# Patient Record
Sex: MALE | Race: WHITE | NOT HISPANIC OR LATINO | Employment: FULL TIME | URBAN - METROPOLITAN AREA
[De-identification: names, ages, dates, MRNs, and addresses within clinical notes are randomized per-mention and may not be internally consistent; named-entity substitution may affect disease eponyms.]

---

## 2018-06-14 ENCOUNTER — AMBULATORY - HEALTHEAST (OUTPATIENT)
Dept: NURSING | Facility: CLINIC | Age: 28
End: 2018-06-14

## 2018-06-14 ENCOUNTER — AMBULATORY - HEALTHEAST (OUTPATIENT)
Dept: FAMILY MEDICINE | Facility: CLINIC | Age: 28
End: 2018-06-14

## 2020-11-10 ENCOUNTER — AMBULATORY - HEALTHEAST (OUTPATIENT)
Dept: NURSING | Facility: CLINIC | Age: 30
End: 2020-11-10

## 2020-11-10 DIAGNOSIS — Z23 NEED FOR IMMUNIZATION AGAINST INFLUENZA: ICD-10-CM

## 2021-06-27 ENCOUNTER — HEALTH MAINTENANCE LETTER (OUTPATIENT)
Age: 31
End: 2021-06-27

## 2021-10-09 ENCOUNTER — TELEPHONE (OUTPATIENT)
Dept: EMERGENCY MEDICINE | Facility: HOSPITAL | Age: 31
End: 2021-10-09

## 2021-10-09 ENCOUNTER — APPOINTMENT (OUTPATIENT)
Dept: CT IMAGING | Facility: HOSPITAL | Age: 31
End: 2021-10-09
Attending: STUDENT IN AN ORGANIZED HEALTH CARE EDUCATION/TRAINING PROGRAM
Payer: COMMERCIAL

## 2021-10-09 ENCOUNTER — HOSPITAL ENCOUNTER (EMERGENCY)
Facility: HOSPITAL | Age: 31
Discharge: HOME | End: 2021-10-09
Attending: STUDENT IN AN ORGANIZED HEALTH CARE EDUCATION/TRAINING PROGRAM | Admitting: STUDENT IN AN ORGANIZED HEALTH CARE EDUCATION/TRAINING PROGRAM
Payer: COMMERCIAL

## 2021-10-09 VITALS
WEIGHT: 160 LBS | OXYGEN SATURATION: 98 % | HEART RATE: 112 BPM | SYSTOLIC BLOOD PRESSURE: 140 MMHG | DIASTOLIC BLOOD PRESSURE: 72 MMHG | TEMPERATURE: 98 F | RESPIRATION RATE: 16 BRPM

## 2021-10-09 DIAGNOSIS — R51.9 NONINTRACTABLE HEADACHE, UNSPECIFIED CHRONICITY PATTERN, UNSPECIFIED HEADACHE TYPE: ICD-10-CM

## 2021-10-09 PROBLEM — L30.9 ECZEMA, UNSPECIFIED TYPE: Status: ACTIVE | Noted: 2018-01-31

## 2021-10-09 LAB — SARS-COV-2 RNA RESP QL NAA+PROBE: POSITIVE

## 2021-10-09 PROCEDURE — C9803 HOPD COVID-19 SPEC COLLECT: HCPCS

## 2021-10-09 PROCEDURE — 99284 EMERGENCY DEPT VISIT MOD MDM: CPT | Mod: 25

## 2021-10-09 PROCEDURE — 87635 SARS-COV-2 COVID-19 AMP PRB: CPT | Performed by: STUDENT IN AN ORGANIZED HEALTH CARE EDUCATION/TRAINING PROGRAM

## 2021-10-09 PROCEDURE — 250N000011 HC RX IP 250 OP 636: Performed by: STUDENT IN AN ORGANIZED HEALTH CARE EDUCATION/TRAINING PROGRAM

## 2021-10-09 PROCEDURE — 70450 CT HEAD/BRAIN W/O DYE: CPT

## 2021-10-09 PROCEDURE — 250N000013 HC RX MED GY IP 250 OP 250 PS 637: Performed by: STUDENT IN AN ORGANIZED HEALTH CARE EDUCATION/TRAINING PROGRAM

## 2021-10-09 RX ORDER — ONDANSETRON 4 MG/1
4 TABLET, ORALLY DISINTEGRATING ORAL ONCE
Status: COMPLETED | OUTPATIENT
Start: 2021-10-09 | End: 2021-10-09

## 2021-10-09 RX ORDER — ONDANSETRON 4 MG/1
4 TABLET, ORALLY DISINTEGRATING ORAL EVERY 8 HOURS PRN
Qty: 10 TABLET | Refills: 0 | Status: SHIPPED | OUTPATIENT
Start: 2021-10-09 | End: 2021-10-12

## 2021-10-09 RX ORDER — ACETAMINOPHEN 325 MG/1
975 TABLET ORAL ONCE
Status: COMPLETED | OUTPATIENT
Start: 2021-10-09 | End: 2021-10-09

## 2021-10-09 RX ADMIN — ACETAMINOPHEN 975 MG: 325 TABLET ORAL at 14:10

## 2021-10-09 RX ADMIN — ONDANSETRON 4 MG: 4 TABLET, ORALLY DISINTEGRATING ORAL at 14:10

## 2021-10-09 NOTE — ED PROVIDER NOTES
Emergency Department Encounter         FINAL IMPRESSION:  Headache        ED COURSE AND MEDICAL DECISION MAKING       ED Course as of Oct 09 1526   Sat Oct 09, 2021   1350 I met with the patient, obtained history, performed an initial exam, and discussed options and plan for diagnostics and treatment here in the ED.      1350 Patient is a healthy 31-year-old here with headache.  Patient states he began having a headache around 10:00 this morning and states it slowly started and then got severe.  States that the severe nature caused him to vomit and have diarrhea.  States he had some blurry vision at that point.  Took some ibuprofen things got mildly better however he was still concerned about his mild 3 out of 10 headache.  Does not get headaches often.  No difficulty walking.  Denies any other red flag symptoms such as weakness or paresthesias, headaches awaken at night, or vision issues now.  Denies any chest pain or trouble breathing.  No neck pain, vertigo or neck stiffness.  On arrival here he looks well.  Vitals are stable.  Heart and lungs normal.  He is mildly tachycardic.  NIH of 0.  No paresthesias.  Patient offered IV meds however would rather try oral medicine first.  Given Tylenol and Zofran and will also obtain CT head      1404 Checked in on and updated patient.      1438 Patient was briefly exposed to someone over a week ago that tested positive for COVID-19 on 10/5/2021 (4 days ago). Would like a COVID-19 test to be safe.       1459 I discussed the plan for discharge with the patient, and patient is agreeable.  We discussed supportive cares at home and reasons for return to the ER including new or worsening symptoms - all questions and concerns addressed.  Patient to be discharged by RN.      -Unlikely subarachnoid.  Neurologically intact.  Unlikely malignancy.  Patient looks well clinically.  Feeling better here.  Sent home on Zofran.  Covid swab sent off as patient was exposed at one point this  "week.                                 At the conclusion of the encounter I discussed the results of all the tests and the disposition. The questions were answered. The patient or family acknowledged understanding and was agreeable with the care plan.                  MEDICATIONS GIVEN IN THE EMERGENCY DEPARTMENT:  Medications   ondansetron (ZOFRAN-ODT) ODT tab 4 mg (4 mg Oral Given 10/9/21 1410)   acetaminophen (TYLENOL) tablet 975 mg (975 mg Oral Given 10/9/21 1410)       NEW PRESCRIPTIONS STARTED AT TODAY'S ED VISIT:  Discharge Medication List as of 10/9/2021  3:06 PM      START taking these medications    Details   ondansetron (ZOFRAN ODT) 4 MG ODT tab Take 1 tablet (4 mg) by mouth every 8 hours as needed for nausea or vomiting, Disp-10 tablet, R-0, Local Print             HPI     Patient information obtained from: patient    Use of Interpretor: N/A     Isidoro Escalante is a 31 year old male with no pertinent history who presents to this ED by walk in for evaluation of headache and nausea.     Patient reports \"feeling off\" last night but awoke at 7:00 AM feeling better. At 10:00 AM patient reports sudden onset of a headache followed by nausea, diarrhea (one episode), and then vomiting (four episodes) with his last episode ~12:30 PM. Patient reports feeling slightly blurry vision just prior, thinking he was dehydrated so he drank Pedialyte and water with no relief. ~11:00 AM, patient took 2 Ibuprofen and reports his headache is now better, rating his pain as a 3-4/10.     Patient endorses that he generally doesn't get headaches and if he does, they only last ~30 minutes and he never takes pain medication for them. Patient also ate breakfast and drank coffee this morning. He reports that his wife had their second child 17 days ago, so he has not been sleeping much, but denies  being awaken by headache or any headache this past week. Patient denies any daily medications aside from seasonal allergy medication. Patient " denies any recent surgeries. Patient denies social history of smoking or drinking alcohol. Patient denies any current nausea, neck pain, chest pain, shortness of breath, fever, cough, congestion, urinary symptoms, numbness, tingling, or any other complaints at this time.         REVIEW OF SYSTEMS:  Review of Systems   Constitutional: Negative for fever, malaise  HEENT: Negative runny nose, sore throat, ear pain, neck pain  Respiratory: Negative for shortness of breath, cough, congestion  Cardiovascular: Negative for chest pain, leg edema  Gastrointestinal: Positive for nausea (resolved), vomiting (4 episodes, resolved), diarrhea (1 episode, resolved). Negative for abdominal distention, abdominal pain, constipation.  Genitourinary: Negative for dysuria and hematuria.   Integument: Negative for rash, skin breakdown  Neurological: Positive for headache, blurry vision (resolved). Negative for paresthesias, numbness, tingling, weakness.  Musculoskeletal: Negative for joint pain, joint swelling      All other systems reviewed and are negative.          MEDICAL HISTORY     History reviewed. No pertinent past medical history.    History reviewed. No pertinent surgical history.    Social History     Tobacco Use     Smoking status: None   Substance Use Topics     Alcohol use: None     Drug use: None       ondansetron (ZOFRAN ODT) 4 MG ODT tab  ibuprofen (ADVIL,MOTRIN) 600 MG tablet            PHYSICAL EXAM     BP (!) 140/72   Pulse 112   Temp 98  F (36.7  C) (Temporal)   Resp 16   Wt 72.6 kg (160 lb)   SpO2 98%       PHYSICAL EXAM:     General: Patient appears well, nontoxic, comfortable  HEENT: Moist mucous membranes, no tongue swelling.  No head trauma.  No midline neck pain.  Cardiovascular: Mildly tachycardic, normal rhythm, no extremity edema.  No appreciable murmur.  Respiratory: No signs of respiratory distress, lungs are clear to auscultation bilaterally with no wheezes rhonchi or rales.  Abdominal: Soft,  nontender, nondistended, no palpable masses, no guarding, no rebound  Musculoskeletal: Full range of motion of joints, no deformities appreciated.  Neurological: Alert and oriented, +5 strength UE/LE, normal finger to nose, , gross sensation intact throughout, CN II-12 intact grossly, no difficulty with ambulation, no slurring of words, no word finding difficulty    Psychological: Normal affect and mood.  Integument: No rashes appreciated          RESULTS       Labs Ordered and Resulted from Time of ED Arrival Up to the Time of Departure from the ED - No data to display    Head CT w/o contrast   Final Result   IMPRESSION:   1.  No acute intracranial process.                        PROCEDURES:  Procedures:  Procedures       I, Mary Rajput, am serving as a scribe to document services personally performed by Juan Jose Blake DO, based on my observations and the provider's statements to me.  I, Juan Jose Blake DO, attest that Mary Rajput is acting in a scribe capacity, has observed my performance of the services and has documented them in accordance with my direction.    Juan Jose Blake DO  Emergency Medicine  [facility] EMERGENCY DEPARTMENT     Juan Jose Blake DO  10/09/21 1537

## 2021-10-09 NOTE — TELEPHONE ENCOUNTER
Coronavirus (COVID-19) Notification    Reason for call  Notify of POSITIVE  COVID-19 lab result, assess symptoms,  review [facility] recommendations    Lab Result   Lab test for 2019-nCoV rRt-PCR or SARS-COV-2 PCR  Oropharyngeal AND/OR nasopharyngeal swabs were POSITIVE for 2019-nCoV RNA [OR] SARS-COV-2 RNA (COVID-19) RNA     We have been unable to reach Patient by phone at this time to notify of their Positive COVID-19 result.  Left voicemail message requesting a call back to [phone] [facility] for results.        POSITIVE COVID-19 Letter sent.    Shruti Santamaria LPN

## 2021-10-09 NOTE — ED TRIAGE NOTES
Sudden onset of headache this AM at 1000, vomited x4 and had some blurry vision at the height of headache pain. Reports symptoms have improved and he no longer has blurry vision or nausea, but states he thinks he would vomit if he ate or drank something. Denies fevers, denies LOC, Denies recent head trauma, denies other complaints.

## 2021-10-17 ENCOUNTER — HEALTH MAINTENANCE LETTER (OUTPATIENT)
Age: 31
End: 2021-10-17

## 2022-07-23 ENCOUNTER — HEALTH MAINTENANCE LETTER (OUTPATIENT)
Age: 32
End: 2022-07-23

## 2022-10-01 ENCOUNTER — HEALTH MAINTENANCE LETTER (OUTPATIENT)
Age: 32
End: 2022-10-01

## 2023-08-12 ENCOUNTER — HEALTH MAINTENANCE LETTER (OUTPATIENT)
Age: 33
End: 2023-08-12

## 2024-09-29 ENCOUNTER — HEALTH MAINTENANCE LETTER (OUTPATIENT)
Age: 34
End: 2024-09-29